# Patient Record
Sex: MALE | Race: OTHER | HISPANIC OR LATINO | Employment: UNEMPLOYED | URBAN - METROPOLITAN AREA
[De-identification: names, ages, dates, MRNs, and addresses within clinical notes are randomized per-mention and may not be internally consistent; named-entity substitution may affect disease eponyms.]

---

## 2023-07-13 ENCOUNTER — HOSPITAL ENCOUNTER (EMERGENCY)
Facility: HOSPITAL | Age: 18
Discharge: HOME | End: 2023-07-13
Attending: EMERGENCY MEDICINE

## 2023-07-13 VITALS
DIASTOLIC BLOOD PRESSURE: 64 MMHG | OXYGEN SATURATION: 100 % | TEMPERATURE: 99 F | RESPIRATION RATE: 18 BRPM | SYSTOLIC BLOOD PRESSURE: 114 MMHG | HEART RATE: 86 BPM | WEIGHT: 150 LBS

## 2023-07-13 DIAGNOSIS — S81.801A LEG WOUND, RIGHT, INITIAL ENCOUNTER: Primary | ICD-10-CM

## 2023-07-13 DIAGNOSIS — M79.604 LEG PAIN, ANTERIOR, RIGHT: ICD-10-CM

## 2023-07-13 LAB
ALBUMIN SERPL BCP-MCNC: 4.4 G/DL (ref 3.5–5.2)
ALP SERPL-CCNC: 116 U/L (ref 55–135)
ALT SERPL W/O P-5'-P-CCNC: 43 U/L (ref 10–44)
ANION GAP SERPL CALC-SCNC: 8 MMOL/L (ref 8–16)
AST SERPL-CCNC: 28 U/L (ref 10–40)
BASOPHILS # BLD AUTO: 0.04 K/UL (ref 0–0.2)
BASOPHILS NFR BLD: 0.2 % (ref 0–1.9)
BILIRUB SERPL-MCNC: 1.7 MG/DL (ref 0.1–1)
BUN SERPL-MCNC: 14 MG/DL (ref 6–20)
CALCIUM SERPL-MCNC: 9.6 MG/DL (ref 8.7–10.5)
CHLORIDE SERPL-SCNC: 107 MMOL/L (ref 95–110)
CK SERPL-CCNC: 587 U/L (ref 20–200)
CO2 SERPL-SCNC: 23 MMOL/L (ref 23–29)
CREAT SERPL-MCNC: 0.8 MG/DL (ref 0.5–1.4)
DIFFERENTIAL METHOD: ABNORMAL
EOSINOPHIL # BLD AUTO: 0.2 K/UL (ref 0–0.5)
EOSINOPHIL NFR BLD: 0.9 % (ref 0–8)
ERYTHROCYTE [DISTWIDTH] IN BLOOD BY AUTOMATED COUNT: 12.9 % (ref 11.5–14.5)
EST. GFR  (NO RACE VARIABLE): >60 ML/MIN/1.73 M^2
GLUCOSE SERPL-MCNC: 91 MG/DL (ref 70–110)
HCT VFR BLD AUTO: 38.3 % (ref 40–54)
HGB BLD-MCNC: 13.3 G/DL (ref 14–18)
IMM GRANULOCYTES # BLD AUTO: 0.06 K/UL (ref 0–0.04)
IMM GRANULOCYTES NFR BLD AUTO: 0.3 % (ref 0–0.5)
LYMPHOCYTES # BLD AUTO: 2 K/UL (ref 1–4.8)
LYMPHOCYTES NFR BLD: 11.5 % (ref 18–48)
MCH RBC QN AUTO: 30.4 PG (ref 27–31)
MCHC RBC AUTO-ENTMCNC: 34.7 G/DL (ref 32–36)
MCV RBC AUTO: 87 FL (ref 82–98)
MONOCYTES # BLD AUTO: 1.2 K/UL (ref 0.3–1)
MONOCYTES NFR BLD: 6.7 % (ref 4–15)
NEUTROPHILS # BLD AUTO: 14.1 K/UL (ref 1.8–7.7)
NEUTROPHILS NFR BLD: 80.4 % (ref 38–73)
NRBC BLD-RTO: 0 /100 WBC
PLATELET # BLD AUTO: 326 K/UL (ref 150–450)
PMV BLD AUTO: 8.9 FL (ref 9.2–12.9)
POTASSIUM SERPL-SCNC: 3.7 MMOL/L (ref 3.5–5.1)
PROT SERPL-MCNC: 7.3 G/DL (ref 6–8.4)
RBC # BLD AUTO: 4.38 M/UL (ref 4.6–6.2)
SODIUM SERPL-SCNC: 138 MMOL/L (ref 136–145)
WBC # BLD AUTO: 17.58 K/UL (ref 3.9–12.7)

## 2023-07-13 PROCEDURE — 63600175 PHARM REV CODE 636 W HCPCS: Performed by: EMERGENCY MEDICINE

## 2023-07-13 PROCEDURE — 96360 HYDRATION IV INFUSION INIT: CPT

## 2023-07-13 PROCEDURE — 90471 IMMUNIZATION ADMIN: CPT | Performed by: EMERGENCY MEDICINE

## 2023-07-13 PROCEDURE — 90715 TDAP VACCINE 7 YRS/> IM: CPT | Performed by: EMERGENCY MEDICINE

## 2023-07-13 PROCEDURE — 85025 COMPLETE CBC W/AUTO DIFF WBC: CPT | Performed by: EMERGENCY MEDICINE

## 2023-07-13 PROCEDURE — 25000003 PHARM REV CODE 250

## 2023-07-13 PROCEDURE — 82550 ASSAY OF CK (CPK): CPT | Performed by: EMERGENCY MEDICINE

## 2023-07-13 PROCEDURE — 99284 EMERGENCY DEPT VISIT MOD MDM: CPT | Mod: 25

## 2023-07-13 PROCEDURE — 80053 COMPREHEN METABOLIC PANEL: CPT | Performed by: EMERGENCY MEDICINE

## 2023-07-13 PROCEDURE — 63600175 PHARM REV CODE 636 W HCPCS

## 2023-07-13 RX ORDER — SULFAMETHOXAZOLE AND TRIMETHOPRIM 800; 160 MG/1; MG/1
1 TABLET ORAL 2 TIMES DAILY
Qty: 14 TABLET | Refills: 0 | Status: SHIPPED | OUTPATIENT
Start: 2023-07-13 | End: 2023-07-20

## 2023-07-13 RX ORDER — SULFAMETHOXAZOLE AND TRIMETHOPRIM 800; 160 MG/1; MG/1
1 TABLET ORAL
Status: COMPLETED | OUTPATIENT
Start: 2023-07-13 | End: 2023-07-13

## 2023-07-13 RX ADMIN — SODIUM CHLORIDE, POTASSIUM CHLORIDE, SODIUM LACTATE AND CALCIUM CHLORIDE 1000 ML: 600; 310; 30; 20 INJECTION, SOLUTION INTRAVENOUS at 10:07

## 2023-07-13 RX ADMIN — SULFAMETHOXAZOLE AND TRIMETHOPRIM 1 TABLET: 800; 160 TABLET ORAL at 10:07

## 2023-07-13 RX ADMIN — TETANUS TOXOID, REDUCED DIPHTHERIA TOXOID AND ACELLULAR PERTUSSIS VACCINE, ADSORBED 0.5 ML: 5; 2.5; 8; 8; 2.5 SUSPENSION INTRAMUSCULAR at 07:07

## 2023-07-13 NOTE — FIRST PROVIDER EVALUATION
Medical screening examination initiated.  I have conducted a focused provider triage encounter, findings are as follows:    Brief history of present illness:  18 yo M with no PMHx presents to the ED with right leg injury. Patient was using a  to wash a car, it slipped and caused a laceration to his right upper leg. He took some ibuprofen with mild improvement in pain. He has pain extending down his right leg. Unknown last tetanus shot.     Vitals:    07/13/23 1841   BP: 122/63   BP Location: Right arm   Patient Position: Sitting   Pulse: 91   Resp: 18   Temp: 98.8 °F (37.1 °C)   TempSrc: Oral   SpO2: 99%   Weight: 68 kg (150 lb)       Pertinent physical exam:  Ambulatory with antalgic gait. Approximately 2 cm laceration to right lower leg- upper anterior portion. There is tenderness to palpation distal to the laceration. Sensation to light touch intact RLE, 2+ DP pulse RLE, comparments of RLE feel soft.      Brief workup plan:  XR, labs.    Preliminary workup initiated; this workup will be continued and followed by the physician or advanced practice provider that is assigned to the patient when roomed.    Summer Piper MD   6:47 PM

## 2023-07-14 NOTE — ED PROVIDER NOTES
Encounter Date: 7/13/2023    SCRIBE #1 NOTE: I, Long Lott, am scribing for, and in the presence of,  CHARLIE Lerma. I have scribed the following portions of the note - Other sections scribed: HPI, ROS.     History     Chief Complaint   Patient presents with    Leg Pain     Pt reports using  today and getting too close to leg at 2pm today. Wound to R shin. Reports tenderness down his leg. Unknown tetanus. Reports taking ibuprofen at 4pm with some relief.      CC: Leg Pain    HPI: Darion Collins is a 17 y.o. male who presents to the ED with his father for evaluation of right leg pain with associated laceration s/p hit with a  today at 1400.  He states the  was only using water.  Pt denies any alleviating/aggravating factors. Pt reports taking Ibuprofen today at 1600 with no relief. Pt denies falling or hitting his head. Pt denies chest pain, SOB, fever, chills, abdominal pain, LOC, or other associated symptoms. Pt states he recently updated his Tetanus. Pt denies taking any daily medications or having any known allergies. Pt denies any antibiotic use in the past month.     The history is provided by the patient. No  was used (ID: [id number]).   Review of patient's allergies indicates:  No Known Allergies  History reviewed. No pertinent past medical history.  History reviewed. No pertinent surgical history.  History reviewed. No pertinent family history.  Social History     Tobacco Use    Smoking status: Never    Smokeless tobacco: Never   Substance Use Topics    Alcohol use: Never    Drug use: Never     Review of Systems   Constitutional:  Negative for chills, fatigue and fever.   HENT:  Negative for congestion, ear pain, rhinorrhea and sore throat.    Eyes:  Negative for pain, discharge and redness.   Respiratory:  Negative for cough and shortness of breath.    Cardiovascular:  Negative for chest pain.   Gastrointestinal:  Negative for  abdominal pain, diarrhea, nausea and vomiting.   Genitourinary:  Negative for dysuria.   Musculoskeletal:  Positive for myalgias. Negative for back pain, neck pain and neck stiffness.   Skin:  Positive for wound. Negative for rash.   Neurological:  Negative for dizziness, syncope, weakness, light-headedness, numbness and headaches.        (-) head trauma  (-) LOC   Psychiatric/Behavioral:  Negative for confusion.      Physical Exam     Initial Vitals [07/13/23 1841]   BP Pulse Resp Temp SpO2   122/63 91 18 98.8 °F (37.1 °C) 99 %      MAP       --         Physical Exam    Nursing note and vitals reviewed.  Constitutional: He appears well-developed and well-nourished. He is not diaphoretic.  Non-toxic appearance. No distress.   HENT:   Head: Normocephalic and atraumatic.   Right Ear: Hearing, tympanic membrane, external ear and ear canal normal. Tympanic membrane is not perforated, not erythematous and not bulging.   Left Ear: Hearing, tympanic membrane, external ear and ear canal normal. Tympanic membrane is not perforated, not erythematous and not bulging.   Nose: Nose normal.   Mouth/Throat: Uvula is midline and oropharynx is clear and moist.   Eyes: Conjunctivae and EOM are normal.   Neck: Neck supple.   Normal range of motion.   Full passive range of motion without pain.     Cardiovascular:            Pulses:       Radial pulses are 2+ on the right side and 2+ on the left side.        Dorsalis pedis pulses are 2+ on the right side and 2+ on the left side.   Pulmonary/Chest: Effort normal and breath sounds normal. No respiratory distress. He has no decreased breath sounds.   Abdominal: Abdomen is soft and flat. There is no abdominal tenderness.   No right CVA tenderness.  No left CVA tenderness. There is no rebound and no guarding.   Musculoskeletal:      Cervical back: Full passive range of motion without pain, normal range of motion and neck supple. No rigidity.      Comments: 1 cm laceration noted to lateral  aspect right proximal shin.  Mild tenderness noted to distal aspect of the laceration to lower R extremity. Compartments are soft to upper and lower right extremity.    Full range motion of bilateral knees, hips, ankles, toes.  Strength and sensation intact bilateral lower extremities.     Neurological: He is alert. No cranial nerve deficit.   Neuro intact.  Strength and sensation intact to bilateral upper and lower extremities.   Skin: Skin is warm and dry. No rash noted.       ED Course   Procedures  Labs Reviewed   CBC W/ AUTO DIFFERENTIAL - Abnormal; Notable for the following components:       Result Value    WBC 17.58 (*)     RBC 4.38 (*)     Hemoglobin 13.3 (*)     Hematocrit 38.3 (*)     MPV 8.9 (*)     Gran # (ANC) 14.1 (*)     Immature Grans (Abs) 0.06 (*)     Mono # 1.2 (*)     Gran % 80.4 (*)     Lymph % 11.5 (*)     All other components within normal limits   COMPREHENSIVE METABOLIC PANEL - Abnormal; Notable for the following components:    Total Bilirubin 1.7 (*)     All other components within normal limits   CK - Abnormal; Notable for the following components:     (*)     All other components within normal limits          Imaging Results              X-Ray Tibia Fibula 2 View Right (Final result)  Result time 07/13/23 19:23:24      Final result by Shreyas Urena MD (07/13/23 19:23:24)                   Impression:      No acute osseous abnormality identified.      Electronically signed by: Shreyas Urena MD  Date:    07/13/2023  Time:    19:23               Narrative:    EXAMINATION:  XR TIBIA FIBULA 2 VIEW RIGHT    CLINICAL HISTORY:  Pain in right leg    TECHNIQUE:  AP and lateral views of the right tibia and fibula were performed.    COMPARISON:  None.    FINDINGS:  No evidence of acute displaced fracture, dislocation, or osseous destructive process.  Joint spaces of the knee and ankle are preserved.                                       Medications   Tdap (BOOSTRIX) vaccine injection 0.5  mL (0.5 mLs Intramuscular Given 7/13/23 1956)   lactated ringers bolus 1,000 mL (0 mLs Intravenous Stopped 7/13/23 2335)   sulfamethoxazole-trimethoprim 800-160mg per tablet 1 tablet (1 tablet Oral Given 7/13/23 2224)     Medical Decision Making:   History:   Old Medical Records: I decided to obtain old medical records.  Clinical Tests:   Lab Tests: Ordered and Reviewed  Radiological Study: Ordered and Reviewed  ED Management:  This is a 17 y.o. male who presents to the ED with his father for evaluation of right leg pain with associated laceration s/p hit with a  today at 1400.On physical exam, patient is wel:40PMl-appearing and in no acute distress.  Nontoxic appearing.  Lungs are clear to auscultation bilaterally.  Abdomen is soft and nontender.  No guarding, rigidity, rebound.  2+ radial pulses bilaterally.  Posterior oropharynx is not erythematous.  No edema or exudate.  Uvula midline.  Bilateral tympanic membrane is normal.  No erythema, bulging, or perforations.  Neuro intact.  Strength and sensation intact bilateral upper and lower extremities.  1 cm laceration noted to lateral aspect right proximal shin.  Mild tenderness noted to distal aspect of the laceration to lower R extremity. Compartments are soft to upper and lower right extremity.    Full range motion of bilateral knees, hips, ankles, toes.  Strength and sensation intact bilateral lower extremities.2+ DP pulses bilaterally.  CBC revealed leukocytosis of 17.58.  Red blood cells 4.38, hemoglobin 13.3, hematocrit 38.3.  CMP revealed total bili 1.7.  Otherwise no other electrolyte abnormality.  .  No evidence of rhabdomyolysis.  Fluids ordered.  X-ray right tib-fib revealed no evidence of any acute osseous abnormalities.  Wound was irrigated and cleansed. bandage applied.  Tetanus updated.  Patient still has soft compartments to his right lower extremity upon reassessment.    Consulted with Dr. Candelaria, orthopedist, at 9:40pm.  She  advised outpatient follow-up tomorrow in her clinic.  She advised starting the patient on Bactrim and close follow-up to the ER if he starts developing any compartmental tightness/stiffness.  She reports not closing the wound.  She called back at 10:00 p.m. today.  She recommends that the patient be admitted to the hospital for IV abx and she will see him in the morning. Discussed with the patient and they would prefer to go home with oral antibiotics and close follow-up in clinic.    Bactrim ordered.  Ambulatory referral to orthopedist ordered.  Urged prompt follow-up with orthopedist and PCP for further evaluation.    Strict return precautions given. I discussed with the patient/family the diagnosis, treatment plan, indications for return to the emergency department, and for expected follow-up. The patient/family verbalized an understanding. The patient/family is asked if there are any questions or concerns. We discuss the case, until all issues are addressed to the patient/family's satisfaction. Patient/family understands and is agreeable to the plan. Patient is stable and ready for discharge.          Scribe Attestation:   Scribe #1: I performed the above scribed service and the documentation accurately describes the services I performed. I attest to the accuracy of the note.      ED Course as of 07/14/23 0145   Thu Jul 13, 2023 2201 CPK(!): 587 [TM]      ED Course User Index  [TM] GWENDOLYN Griffin attestation: I, RayrayDeyanira Pace, personally performed the services described in this documentation.  All medical record entries made by the scribe were at my direction and in my presence.  I have reviewed the chart and agree that the record reflects my personal performance and is accurate and complete.    Clinical Impression:   Final diagnoses:  [M79.604] Leg pain, anterior, right  [M79.604] Leg pain, anterior, right -  injury  [S81.801A] Leg wound, right, initial encounter  (Primary)        ED Disposition Condition    Discharge Stable          ED Prescriptions       Medication Sig Dispense Start Date End Date Auth. Provider    sulfamethoxazole-trimethoprim 800-160mg (BACTRIM DS) 800-160 mg Tab Take 1 tablet by mouth 2 (two) times daily. for 7 days 14 tablet 7/13/2023 7/20/2023 Vamshi Pace PA-C          Follow-up Information       Follow up With Specialties Details Why Contact Info    Mari Candelaria MD Orthopedic Surgery In 1 day for further evaluation [address]  [address]  [facility]  [facility] [address]  [phone]      [facility]  Schedule an appointment as soon as possible for a visit in 2 days for further evaluation [address] [facility] [address]  [phone]      [facility] Emergency Dept Emergency Medicine In 1 day If symptoms worsen [address]  [facility] [id number] [phone]             Vamshi Pace PA-C  07/14/23 0144       Vamshi Pace PA-C  07/14/23 0145

## 2023-07-14 NOTE — ED TRIAGE NOTES
18 yo male presents to the ED, escorted by his father, with c/o injury to the lower right leg. Pt reports that around 1500/1600 he was assisting a friend with automotive work when a tool fell and hit him in the right leg (placing a small laceration to the anterior of the leg). Pt rates pain at 6 on a PRS of 0-10; denies any other injuries/symptoms.  No distress noted at this time.

## 2023-07-14 NOTE — DISCHARGE INSTRUCTIONS
Regrese al departamento de emergencias si presenta síntomas nuevos o que empeoran, incluidos: empeoramiento de la tensión muscular/dolor en la pierna derecha, fiebre, dolor en el pecho, dificultad para respirar, pérdida del conocimiento, mareos, debilidad o cualquier otra inquietud.    Bam un seguimiento con etienne proveedor de atención primaria dentro de la semana. Si no tiene deana, puede comunicarse con el que figura en etienne documentación de jaz o también puede llamar al mostrador de citas de la [facility] al [phone] para programar elayne karine con deana.    [place] todos los medicamentos según lo prescrito.